# Patient Record
Sex: MALE | Race: WHITE | Employment: UNEMPLOYED | URBAN - METROPOLITAN AREA
[De-identification: names, ages, dates, MRNs, and addresses within clinical notes are randomized per-mention and may not be internally consistent; named-entity substitution may affect disease eponyms.]

---

## 2018-02-05 ENCOUNTER — HOSPITAL ENCOUNTER (EMERGENCY)
Age: 23
Discharge: HOME | End: 2018-02-05
Attending: EMERGENCY MEDICINE
Payer: COMMERCIAL

## 2018-02-05 VITALS
WEIGHT: 137.5 LBS | SYSTOLIC BLOOD PRESSURE: 124 MMHG | HEART RATE: 89 BPM | BODY MASS INDEX: 20.37 KG/M2 | RESPIRATION RATE: 16 BRPM | TEMPERATURE: 97.9 F | OXYGEN SATURATION: 99 % | DIASTOLIC BLOOD PRESSURE: 76 MMHG | HEIGHT: 69 IN

## 2018-02-05 DIAGNOSIS — L02.91 ABSCESS: Primary | ICD-10-CM

## 2018-02-05 PROCEDURE — 10060 I&D ABSCESS SIMPLE/SINGLE: CPT

## 2018-02-05 PROCEDURE — 2500000003 HC RX 250 WO HCPCS

## 2018-02-05 PROCEDURE — 99283 EMERGENCY DEPT VISIT LOW MDM: CPT

## 2018-02-05 RX ORDER — LIDOCAINE HYDROCHLORIDE 10 MG/ML
INJECTION, SOLUTION INFILTRATION; PERINEURAL
Status: COMPLETED
Start: 2018-02-05 | End: 2018-02-05

## 2018-02-05 RX ORDER — ACETAMINOPHEN AND CODEINE PHOSPHATE 300; 30 MG/1; MG/1
1-2 TABLET ORAL EVERY 6 HOURS PRN
Qty: 12 TABLET | Refills: 0 | Status: SHIPPED | OUTPATIENT
Start: 2018-02-05 | End: 2018-02-08

## 2018-02-05 RX ORDER — LIDOCAINE HYDROCHLORIDE 10 MG/ML
5 INJECTION, SOLUTION INFILTRATION; PERINEURAL ONCE
Status: COMPLETED | OUTPATIENT
Start: 2018-02-05 | End: 2018-02-05

## 2018-02-05 RX ORDER — SULFAMETHOXAZOLE AND TRIMETHOPRIM 800; 160 MG/1; MG/1
1 TABLET ORAL 2 TIMES DAILY
Qty: 14 TABLET | Refills: 0 | Status: SHIPPED | OUTPATIENT
Start: 2018-02-05 | End: 2018-02-12

## 2018-02-05 RX ADMIN — LIDOCAINE HYDROCHLORIDE 5 ML: 10 INJECTION, SOLUTION INFILTRATION; PERINEURAL at 11:23

## 2018-02-05 ASSESSMENT — ENCOUNTER SYMPTOMS
COLOR CHANGE: 1
GASTROINTESTINAL NEGATIVE: 1
RESPIRATORY NEGATIVE: 1

## 2018-02-05 ASSESSMENT — PAIN DESCRIPTION - ORIENTATION: ORIENTATION: LOWER;LEFT

## 2018-02-05 ASSESSMENT — PAIN SCALES - GENERAL
PAINLEVEL_OUTOF10: 8
PAINLEVEL_OUTOF10: 8

## 2018-02-05 ASSESSMENT — PAIN DESCRIPTION - LOCATION: LOCATION: ARM

## 2018-02-05 ASSESSMENT — PAIN DESCRIPTION - DESCRIPTORS: DESCRIPTORS: TENDER;THROBBING

## 2018-02-05 NOTE — ED PROVIDER NOTES
The patient was seen and examined by me in conjunction with the resident. I agree with his/her assessment and treatment plan. The abscess has been incised and drained. He will return in 24 hours for packing removal and is placed on an antibiotic.      Jose L Turner MD  02/05/18 8394
ED  [address] [facility]  [phone]  In 1 day  For wound re-check and pull packing. DISCHARGE MEDICATIONS:  Discharge Medication List as of 2/5/2018 12:06 PM      START taking these medications    Details   sulfamethoxazole-trimethoprim (BACTRIM DS) 800-160 MG per tablet Take 1 tablet by mouth 2 times daily for 7 days, Disp-14 tablet, R-0Print      acetaminophen-codeine (TYLENOL #3) 300-30 MG per tablet Take 1-2 tablets by mouth every 6 hours as needed for Pain for up to 3 days. , Disp-12 tablet, R-0Print                Problem List:  There is no problem list on file for this patient. Summation      Patient Course:  Seen and evaluated. Abscess drained and packed. Pt to follow up tomorrow to pull packing. ED Medications administered this visit:    Medications   lidocaine 1 % injection 5 mL (5 mLs Intradermal Given by Other 2/5/18 1123)       New Prescriptions from this visit:    Discharge Medication List as of 2/5/2018 12:06 PM      START taking these medications    Details   sulfamethoxazole-trimethoprim (BACTRIM DS) 800-160 MG per tablet Take 1 tablet by mouth 2 times daily for 7 days, Disp-14 tablet, R-0Print      acetaminophen-codeine (TYLENOL #3) 300-30 MG per tablet Take 1-2 tablets by mouth every 6 hours as needed for Pain for up to 3 days. , Disp-12 tablet, R-0Print             Follow-up:  [facility] ED  [address] [facility]  [phone]  In 1 day  For wound re-check and pull packing. Final Impression:   1.  Abscess               (Please note that portions of this note were completed with a voice recognition program.  Efforts were made to edit the dictations but occasionally words are mis-transcribed.)    Angelique Soriano DPM (electronically signed)              Angelique Soriano DPM  Resident  02/05/18 1579

## 2018-02-06 ENCOUNTER — HOSPITAL ENCOUNTER (EMERGENCY)
Age: 23
Discharge: HOME | End: 2018-02-06
Attending: EMERGENCY MEDICINE
Payer: COMMERCIAL

## 2018-02-06 VITALS
DIASTOLIC BLOOD PRESSURE: 65 MMHG | OXYGEN SATURATION: 97 % | HEIGHT: 73 IN | BODY MASS INDEX: 18.82 KG/M2 | HEART RATE: 85 BPM | WEIGHT: 142 LBS | RESPIRATION RATE: 16 BRPM | SYSTOLIC BLOOD PRESSURE: 120 MMHG

## 2018-02-06 DIAGNOSIS — Z51.89 WOUND CHECK, ABSCESS: Primary | ICD-10-CM

## 2018-02-06 PROCEDURE — 99282 EMERGENCY DEPT VISIT SF MDM: CPT

## 2018-02-06 ASSESSMENT — PAIN DESCRIPTION - LOCATION: LOCATION: ARM

## 2018-02-06 ASSESSMENT — PAIN DESCRIPTION - PAIN TYPE: TYPE: ACUTE PAIN

## 2018-02-06 ASSESSMENT — PAIN DESCRIPTION - ORIENTATION: ORIENTATION: LEFT

## 2018-02-06 ASSESSMENT — PAIN SCALES - GENERAL: PAINLEVEL_OUTOF10: 5

## 2018-02-07 ASSESSMENT — ENCOUNTER SYMPTOMS
NAUSEA: 0
VOMITING: 0

## 2018-02-07 NOTE — ED NOTES
Pt presents to ED ambulatory with steady gait c/o of wound re-check. Pt states wound was I&D yesterday and packing was placed. Pt rates pain 5/10. Pt states taking prescribed medications as directed. Pt denies fever or chills. Pt denies increased pain or swelling.       Lavern Arrieta RN  02/06/18 6274

## 2018-02-07 NOTE — ED PROVIDER NOTES
status information on file. SOCIAL HISTORY      reports that he has been smoking Cigarettes. He has been smoking about 0.50 packs per day. He has never used smokeless tobacco. He reports that he uses drugs, including Marijuana. He reports that he does not drink alcohol. REVIEW OF SYSTEMS    (2-9 systems for level 4, 10 or more for level 5)     Review of Systems   Constitutional: Negative for chills, diaphoresis, fatigue and fever. Gastrointestinal: Negative for nausea and vomiting. Musculoskeletal: Negative for myalgias. Left arm pain   Skin: Positive for wound. Neurological: Negative for numbness. Except as noted above the remainder of the review of systems was reviewed and negative. PHYSICAL EXAM    (up to 7 for level 4, 8 or more for level 5)     ED Triage Vitals [02/06/18 2238]   BP Temp Temp src Pulse Resp SpO2 Height Weight   120/65 -- -- 85 16 97 % 6' 1\" (1.854 m) 142 lb (64.4 kg)       Physical Exam   Constitutional: He is oriented to person, place, and time. He appears well-developed and well-nourished. He does not appear ill. No distress. HENT:   Head: Normocephalic and atraumatic. Cardiovascular: Normal rate and regular rhythm. Pulmonary/Chest: Breath sounds normal. No respiratory distress. Musculoskeletal:   Full range of motion to the left fingers, wrist and elbow. No edema   Neurological: He is alert and oriented to person, place, and time. Skin: Skin is warm and dry.               DIAGNOSTIC RESULTS     EKG: All EKG's are interpreted by the Emergency Department Physician who either signs or Co-signs this chart in the absence of a cardiologist.      RADIOLOGY:   Non-plain film images such as CT, Ultrasound and MRI are read by the radiologist. Plain radiographic images are visualized and preliminarily interpreted by the emergency physician with the below findings:      Interpretation per the Radiologist below, if available at the time of this note:    No orders to display       LABS:  Labs Reviewed - No data to display    All other labs were within normal range or not returned as of this dictation. EMERGENCY DEPARTMENT COURSE and DIFFERENTIAL DIAGNOSIS/MDM:   Vitals:    Vitals:    18 2238   BP: 120/65   Pulse: 85   Resp: 16   SpO2: 97%   Weight: 142 lb (64.4 kg)   Height: 6' 1\" (1.854 m)       Medical Decision Makin-year-old male who is afebrile and does not appear ill. I removed the packing. He still has an area that was area of induration near the incision site. There are markings on the forearm from previous extensive surrounding erythema which has resolved. There is no purulent drainage. He has full range of motion to the extremity. He was advised to continue using the antibiotic and pain medication as previously prescribed. He was advised to start using warm compresses 3-4 times daily and follow-up with a primary care provider. He will return to the emergency room for any new or worsening symptoms. FINAL IMPRESSION      1. Wound check, abscess          DISPOSITION/PLAN   DISPOSITION Decision To Discharge 2018 11:25:41 PM      PATIENT REFERRED TO:   Follow up with a primary care provider  [place]  Call in 1 day      [facility] ED  [address] [facility]  [phone]    If symptoms worsen      DISCHARGE MEDICATIONS:     Discharge Medication List as of 2018 11:27 PM        (Please note that portions of this note were completed with a voice recognition program.  Efforts were made to edit the dictations but occasionally words are mis-transcribed. )    ROMEO JOHNSON NP  18 [phone]

## 2018-02-07 NOTE — ED NOTES
Wound dressed with non adherent dressing and wrapped with guaze and coflex. Pt given instructions on cleaning and follow up as needed. Pt had no further questions.       Adiel Castro RN  02/06/18 7280

## 2020-07-07 ENCOUNTER — HOSPITAL ENCOUNTER (EMERGENCY)
Age: 25
Discharge: LEFT BEFORE DISCHARGE | End: 2020-07-07

## 2020-07-07 VITALS
SYSTOLIC BLOOD PRESSURE: 139 MMHG | BODY MASS INDEX: 20.32 KG/M2 | DIASTOLIC BLOOD PRESSURE: 79 MMHG | WEIGHT: 150 LBS | HEIGHT: 72 IN | RESPIRATION RATE: 16 BRPM | OXYGEN SATURATION: 98 % | TEMPERATURE: 97.8 F | HEART RATE: 81 BPM

## 2020-07-07 PROCEDURE — 4500000002 HC ER NO CHARGE

## 2020-07-07 ASSESSMENT — PAIN DESCRIPTION - ONSET: ONSET: ON-GOING

## 2020-07-07 ASSESSMENT — PAIN DESCRIPTION - FREQUENCY: FREQUENCY: CONTINUOUS

## 2020-07-07 ASSESSMENT — PAIN DESCRIPTION - PAIN TYPE: TYPE: ACUTE PAIN

## 2020-07-07 ASSESSMENT — PAIN DESCRIPTION - DESCRIPTORS: DESCRIPTORS: BURNING

## 2020-07-07 ASSESSMENT — PAIN DESCRIPTION - LOCATION: LOCATION: TEETH

## 2020-07-07 ASSESSMENT — PAIN DESCRIPTION - PROGRESSION: CLINICAL_PROGRESSION: NOT CHANGED

## 2020-07-07 ASSESSMENT — PAIN SCALES - GENERAL: PAINLEVEL_OUTOF10: 10

## 2025-03-27 ENCOUNTER — HOSPITAL ENCOUNTER (EMERGENCY)
Age: 30
Discharge: HOME | End: 2025-03-27
Attending: EMERGENCY MEDICINE

## 2025-03-27 VITALS
SYSTOLIC BLOOD PRESSURE: 107 MMHG | DIASTOLIC BLOOD PRESSURE: 65 MMHG | WEIGHT: 151 LBS | HEART RATE: 100 BPM | RESPIRATION RATE: 18 BRPM | TEMPERATURE: 97.8 F | BODY MASS INDEX: 20.48 KG/M2 | OXYGEN SATURATION: 97 %

## 2025-03-27 DIAGNOSIS — K04.7 DENTAL ABSCESS: Primary | ICD-10-CM

## 2025-03-27 PROCEDURE — 99283 EMERGENCY DEPT VISIT LOW MDM: CPT

## 2025-03-27 PROCEDURE — 6370000000 HC RX 637 (ALT 250 FOR IP): Performed by: NURSE PRACTITIONER

## 2025-03-27 PROCEDURE — 6360000002 HC RX W HCPCS: Performed by: NURSE PRACTITIONER

## 2025-03-27 RX ORDER — CLINDAMYCIN HYDROCHLORIDE 150 MG/1
300 CAPSULE ORAL 3 TIMES DAILY
Qty: 60 CAPSULE | Refills: 0 | Status: SHIPPED | OUTPATIENT
Start: 2025-03-27 | End: 2025-04-06

## 2025-03-27 RX ORDER — IBUPROFEN 800 MG/1
800 TABLET, FILM COATED ORAL EVERY 8 HOURS PRN
Qty: 15 TABLET | Refills: 0 | Status: SHIPPED | OUTPATIENT
Start: 2025-03-27

## 2025-03-27 RX ORDER — ACETAMINOPHEN 325 MG/1
650 TABLET ORAL ONCE
Status: COMPLETED | OUTPATIENT
Start: 2025-03-27 | End: 2025-03-27

## 2025-03-27 RX ORDER — KETOROLAC TROMETHAMINE 30 MG/ML
60 INJECTION, SOLUTION INTRAMUSCULAR; INTRAVENOUS ONCE
Status: DISCONTINUED | OUTPATIENT
Start: 2025-03-27 | End: 2025-03-27 | Stop reason: HOSPADM

## 2025-03-27 RX ORDER — HYDROCODONE BITARTRATE AND ACETAMINOPHEN 5; 325 MG/1; MG/1
1 TABLET ORAL EVERY 8 HOURS PRN
Qty: 9 TABLET | Refills: 0 | Status: SHIPPED | OUTPATIENT
Start: 2025-03-27 | End: 2025-03-30

## 2025-03-27 RX ORDER — CLINDAMYCIN HYDROCHLORIDE 150 MG/1
300 CAPSULE ORAL ONCE
Status: COMPLETED | OUTPATIENT
Start: 2025-03-27 | End: 2025-03-27

## 2025-03-27 RX ADMIN — ACETAMINOPHEN 650 MG: 325 TABLET, FILM COATED ORAL at 11:20

## 2025-03-27 RX ADMIN — CLINDAMYCIN HYDROCHLORIDE 300 MG: 150 CAPSULE ORAL at 11:19

## 2025-03-27 ASSESSMENT — ENCOUNTER SYMPTOMS
RHINORRHEA: 0
SORE THROAT: 0
TROUBLE SWALLOWING: 0
FACIAL SWELLING: 1
COLOR CHANGE: 0
SHORTNESS OF BREATH: 0
EYE PAIN: 0

## 2025-03-27 NOTE — ED PROVIDER NOTES
[facility] EMERGENCY DEPARTMENT  eMERGENCY dEPARTMENT eNCOUnter      Pt Name: Alan Gonzalez  MRN: [id number]  Birthdate 1995  Date of evaluation: 3/27/2025  Provider: NAEEM Pacheco CNP    CHIEF COMPLAINT       Chief Complaint   Patient presents with    Facial Swelling     R side, cheekbone area          HISTORY OF PRESENT ILLNESS  (Location/Symptom, Timing/Onset, Context/Setting, Quality, Duration, Modifying Factors, Severity.)   Alan Gonzalez is a 29 y.o. male who presents to the emergency department. C/o right lower facial pain, swelling. Onset was 2-3 days ago. Denies fever, chills, injury, SOB, difficulty swallowing. Rates his pain 10/10. Denies sinus congestion/drainage.      Nursing Notes were reviewed.    ALLERGIES     Patient has no known allergies.    CURRENT MEDICATIONS       Discharge Medication List as of 3/27/2025 11:28 AM        CONTINUE these medications which have NOT CHANGED    Details   cephALEXin (KEFLEX) 500 MG capsule Take 1 capsule by mouth 3 times daily, Disp-30 capsule, R-0             PAST MEDICAL HISTORY   No past medical history on file.    SURGICAL HISTORY     No past surgical history on file.      FAMILY HISTORY     No family history on file.  No family status information on file.        SOCIAL HISTORY      reports that he has been smoking cigarettes. He has never used smokeless tobacco. He reports current drug use. Drug: Marijuana (Weed). He reports that he does not drink alcohol.    REVIEW OF SYSTEMS    (2-9 systems for level 4, 10 or more for level 5)       Review of Systems   Constitutional:  Negative for chills, diaphoresis, fatigue and fever.   HENT:  Positive for dental problem and facial swelling. Negative for congestion, drooling, ear pain, rhinorrhea, sore throat and trouble swallowing.    Eyes:  Negative for pain and visual disturbance.   Respiratory:  Negative for shortness of breath.    Cardiovascular:  Negative for chest pain.

## 2025-03-27 NOTE — DISCHARGE INSTRUCTIONS
Dental Clinics:    [facility]  [address]  [phone]    [facility]  [address]  [phone]  Open 8am-4:30pm  (appt exam & xrays $37.00)    [facility]  (Service for the homeless)  [address]   [phone]    Dentist who take medicaid:    Prashant Angel  [address]  [phone] call [phone]  For appt.    Wilber Sarah  [address]  [phone] call [phone]  For appt.    [facility]  [address]  [phone]  (takes FHP w/PCP referral  Only one who accepts FHP)    Lincoln Mcnulty DDS  [facility]  [phone]  [email or web address]    [facility]  [address] [place], [place]  [phone]  Accepts medicaid, low cost exams,  Fillings, cleanings, x-rays, sealants  Open Mon-Fri 8a-5p, open one evening  A week for appts.    Pediatric Dentist:    Spencer Palacios  [address]  [phone] accepts Medicaid  (Only children 12 and under)    [facility]  [address]  [phone]  (Only children 12 and under)    [facility]  [address], [place]  [phone]  (ages 3-18yrs only)    [facility]  Infants & Teens  Dental Care  [address] [place], [place]  [phone]  [email or web address]    [facility]  [phone] or  [phone]    Dental Referral Services  [phone]    Dentist Accepting New Non-Medicaid Patients:    Tayo Jimenez  [address]  [phone]    Maynor Durbin  [address] [facility]  [phone]    Oral Surgeon's:    Aleks Castrejon Ziegler  [address] [facility] [address] [facility]  [phone]   (Takes [place] Medicaid)    Dr. Victorino Rankin  [address]  [phone]  (Takes [place] [employer]/Medicaid)    [facility]  [phone]  [address]

## 2025-03-27 NOTE — ED PROVIDER NOTES
eMERGENCY dEPARTMENT eNCOUnter   Independent Attestation     Pt Name: Alan Gonzalez  MRN: [id number]  Birthdate 1995  Date of evaluation: 3/27/25     Alan Gonzalez is a 29 y.o. male with CC: Facial Swelling (R side, cheekbone area )        This visit was performed by both a physician and an APC. I performed all aspects of the MDM as documented.      Veronica Akhtar MD  Attending Emergency Physician            Veronica Akhtar MD  03/27/25 7176